# Patient Record
Sex: FEMALE | Employment: FULL TIME | URBAN - METROPOLITAN AREA
[De-identification: names, ages, dates, MRNs, and addresses within clinical notes are randomized per-mention and may not be internally consistent; named-entity substitution may affect disease eponyms.]

---

## 2018-04-26 LAB
HPV ABSTRACT: NORMAL
PAP-ABSTRACT: NORMAL

## 2019-08-04 ENCOUNTER — TRANSFERRED RECORDS (OUTPATIENT)
Dept: MULTI SPECIALTY CLINIC | Facility: CLINIC | Age: 39
End: 2019-08-04

## 2019-08-04 LAB
CHOLEST SERPL-MCNC: 177 MG/DL (ref 0–199)
GLUCOSE SERPL-MCNC: 102 MG/DL (ref 70–100)
HBA1C MFR BLD: 5.4 % (ref 0–5.7)
HDLC SERPL-MCNC: 60 MG/DL
LDLC SERPL CALC-MCNC: 107 MG/DL
NONHDLC SERPL-MCNC: 117 MG/DL
TRIGL SERPL-MCNC: 50 MG/DL

## 2019-09-17 ENCOUNTER — OFFICE VISIT (OUTPATIENT)
Dept: FAMILY MEDICINE | Facility: CLINIC | Age: 39
End: 2019-09-17
Payer: OTHER MISCELLANEOUS

## 2019-09-17 ENCOUNTER — ALLIED HEALTH/NURSE VISIT (OUTPATIENT)
Dept: NURSING | Facility: CLINIC | Age: 39
End: 2019-09-17
Payer: OTHER MISCELLANEOUS

## 2019-09-17 ENCOUNTER — ANCILLARY PROCEDURE (OUTPATIENT)
Dept: GENERAL RADIOLOGY | Facility: CLINIC | Age: 39
End: 2019-09-17
Attending: FAMILY MEDICINE
Payer: OTHER MISCELLANEOUS

## 2019-09-17 VITALS
OXYGEN SATURATION: 100 % | HEART RATE: 67 BPM | DIASTOLIC BLOOD PRESSURE: 76 MMHG | SYSTOLIC BLOOD PRESSURE: 113 MMHG | WEIGHT: 136.8 LBS

## 2019-09-17 VITALS
DIASTOLIC BLOOD PRESSURE: 76 MMHG | HEIGHT: 64 IN | HEART RATE: 67 BPM | WEIGHT: 136.8 LBS | SYSTOLIC BLOOD PRESSURE: 113 MMHG | OXYGEN SATURATION: 100 % | BODY MASS INDEX: 23.35 KG/M2 | TEMPERATURE: 98 F | RESPIRATION RATE: 16 BRPM

## 2019-09-17 DIAGNOSIS — S61.219A FINGER LACERATION, INITIAL ENCOUNTER: ICD-10-CM

## 2019-09-17 DIAGNOSIS — S62.639A CLOSED FRACTURE OF TUFT OF DISTAL PHALANX OF FINGER: ICD-10-CM

## 2019-09-17 DIAGNOSIS — S60.139A: ICD-10-CM

## 2019-09-17 DIAGNOSIS — S67.22XA HAND CRUSH INJURY, LEFT, INITIAL ENCOUNTER: Primary | ICD-10-CM

## 2019-09-17 DIAGNOSIS — S67.22XA HAND CRUSH INJURY, LEFT, INITIAL ENCOUNTER: ICD-10-CM
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
## 2019-09-17 PROCEDURE — 11760 REPAIR OF NAIL BED: CPT | Mod: F3 | Performed by: FAMILY MEDICINE

## 2019-09-17 PROCEDURE — 73130 X-RAY EXAM OF HAND: CPT | Mod: LT

## 2019-09-17 PROCEDURE — 99213 OFFICE O/P EST LOW 20 MIN: CPT | Mod: 25 | Performed by: FAMILY MEDICINE

## 2019-09-17 PROCEDURE — 12001 RPR S/N/AX/GEN/TRNK 2.5CM/<: CPT | Performed by: FAMILY MEDICINE

## 2019-09-17 RX ORDER — HYDROCODONE BITARTRATE AND ACETAMINOPHEN 5; 325 MG/1; MG/1
1 TABLET ORAL EVERY 6 HOURS PRN
Qty: 20 TABLET | Refills: 0 | Status: SHIPPED | OUTPATIENT
Start: 2019-09-17 | End: 2019-09-26

## 2019-09-17 RX ORDER — ALPRAZOLAM 1 MG
1 TABLET ORAL
COMMUNITY
Start: 2019-07-31

## 2019-09-17 ASSESSMENT — PAIN SCALES - GENERAL
PAINLEVEL: WORST PAIN (10)
PAINLEVEL: WORST PAIN (10)

## 2019-09-17 ASSESSMENT — MIFFLIN-ST. JEOR: SCORE: 1290.52

## 2019-09-17 NOTE — NURSING NOTE
This smart phrase will be removed from your smart phrase list on September 16th.   Please use the system smart phrase: .abstractdata which has been updated.    Please abstract the following data from this visit with this patient into the appropriate field in Epic:    Tests that can be patient reported without a hard copy:          Other Tests found in the patient's chart through Chart Review/Care Everywhere:    Pap smear done by [facility] on this date: 04/26/2018    Note to Abstraction: If this section is blank, no results were found via Chart Review/Care Everywhere.

## 2019-09-17 NOTE — PROGRESS NOTES
"Subjective     Leona Cano is a 39 year old female who presents to clinic today for the following health issues:    HPI   Concern - Laceration   Onset: today     Description:   Patient stated that a machine at work was moving to much and was trying to stop it by holding it and crushed her hand.     Intensity: severe- painful make her want to throw up    Progression of Symptoms:  Off and on     Therapies Tried and outcome: Nothing but used a paper towel patient didn't clean wound.    There is no problem list on file for this patient.    History reviewed. No pertinent surgical history.    Social History     Tobacco Use     Smoking status: Former Smoker     Smokeless tobacco: Never Used   Substance Use Topics     Alcohol use: Yes     Family History   Problem Relation Age of Onset     Heart Disease Mother            Reviewed and updated as needed this visit by Provider         Review of Systems   ROS COMP: Constitutional, HEENT, cardiovascular, pulmonary, GI, , musculoskeletal, neuro, skin, endocrine and psych systems are negative, except as otherwise noted.      Objective    /76 (BP Location: Right arm, Patient Position: Sitting, Cuff Size: Adult Regular)   Pulse 67   Temp 98  F (36.7  C) (Oral)   Resp 16   Ht 1.626 m (5' 4\")   Wt 62.1 kg (136 lb 12.8 oz)   LMP 09/03/2019 (Approximate)   SpO2 100%   Breastfeeding? No   BMI 23.48 kg/m    Body mass index is 23.48 kg/m .  Physical Exam   GENERAL: healthy, alert and no distress  NECK: no adenopathy, no asymmetry, masses, or scars and thyroid normal to palpation  RESP: lungs clear to auscultation - no rales, rhonchi or wheezes  CV: regular rate and rhythm, normal S1 S2, no S3 or S4, no murmur, click or rub, no peripheral edema and peripheral pulses strong  ABDOMEN: soft, nontender, no hepatosplenomegaly, no masses and bowel sounds normal  MS: no gross musculoskeletal defects noted, no edema  SKIN: 2 cm laceration of 4th, left digit. 3rd digit nail bed " partially damage and protruding out with nail bed laceration.    Diagnostic Test Results:  Labs reviewed in Epic        Assessment & Plan     1. Hand crush injury, left, initial encounter  Distal 3rd fracture. Comfort care at this time. Should heal 6-8 weeks discussed.  - XR Hand Left G/E 3 Views; Future  - HYDROcodone-acetaminophen (NORCO) 5-325 MG tablet; Take 1 tablet by mouth every 6 hours as needed for pain  Dispense: 20 tablet; Refill: 0    2. Finger laceration, initial encounter  Area cleansed with sterile water and iodine. Local anesthesia done with 2% lido w/o epi. 5 sutures placed in 4th digit and 4 sutures place in 3rd digit. RTC in 10 days for removal.  - REPAIR INTERMED, WOUND NCK/HNDS/FEET/GEN <=2.5 CM    3. Closed fracture of tuft of distal phalanx of finger  Buddy tape for comfort.    4. Contusion of middle finger with damage to nail, initial encounter  Digital block done with 3 ml of 2% lidocaine w/o epi. Nail removed without complications. Nailbed lacerations repaired with sutures.  - REMOVAL NAIL/NAIL BED, PARTIAL OR COMPLETE       See Patient Instructions    Return in about 10 days (around 9/27/2019) for suture removal.    Joey Valente MD, MD  [facility]

## 2019-09-17 NOTE — Clinical Note
This smart phrase will be removed from your smart phrase list on September 16th. Please use the system smart phrase: .abstractdata which has been updated.Please abstract the following data from this visit with this patient into the appropriate field in Epic:Tests that can be patient reported without a hard copy:{Quality Abstract List (Optional):915362}Other Tests found in the patient's chart through Chart Review/Care Everywhere:Pap smear done by [facility] on this date: 04/26/2018Note to Abstraction: If this section is blank, no results were found via Chart Review/Care Everywhere.

## 2019-09-17 NOTE — NURSING NOTE
RN Triage:     Chief Complaint   Patient presents with     Work Comp     crushing finger injury, left hand       Initial /76 (BP Location: Right arm, Patient Position: Chair, Cuff Size: Adult Regular)   Pulse 67   Wt 62.1 kg (136 lb 12.8 oz)   SpO2 100%  There is no height or weight on file to calculate BMI.  BP completed using cuff size: regular    Assessment:  Stable but nauseous from the pain.    Triage Dispo: patient is being roomed by MA staff    Intervention: removed toilet paper wrap and given gauze.    Luh Bai, RN, RN

## 2019-09-17 NOTE — LETTER
[address] [address]  Phone: [phone]    September 17, 2019        Leona Cano  [address]  [facility] [address]          To whom it may concern:    RE: Leona Cano    Patient may return to work 9/23/2019 with the following:  Light duty-unable to use left hand.  When the patient returns to work, the following restrictions apply until 10/15/2019.      Please contact me for questions or concerns.      Sincerely,        Joey Valente MD

## 2019-09-26 ENCOUNTER — OFFICE VISIT (OUTPATIENT)
Dept: FAMILY MEDICINE | Facility: CLINIC | Age: 39
End: 2019-09-26
Payer: OTHER MISCELLANEOUS

## 2019-09-26 VITALS
BODY MASS INDEX: 23.22 KG/M2 | HEIGHT: 64 IN | TEMPERATURE: 98.4 F | RESPIRATION RATE: 16 BRPM | DIASTOLIC BLOOD PRESSURE: 81 MMHG | SYSTOLIC BLOOD PRESSURE: 139 MMHG | HEART RATE: 73 BPM | WEIGHT: 136 LBS | OXYGEN SATURATION: 99 %

## 2019-09-26 DIAGNOSIS — S67.22XA HAND CRUSH INJURY, LEFT, INITIAL ENCOUNTER: Primary | ICD-10-CM

## 2019-09-26 PROCEDURE — 99213 OFFICE O/P EST LOW 20 MIN: CPT | Mod: 24 | Performed by: FAMILY MEDICINE

## 2019-09-26 RX ORDER — HYDROCODONE BITARTRATE AND ACETAMINOPHEN 5; 325 MG/1; MG/1
1 TABLET ORAL EVERY 6 HOURS PRN
Qty: 20 TABLET | Refills: 0 | Status: SHIPPED | OUTPATIENT
Start: 2019-09-26 | End: 2019-11-22

## 2019-09-26 RX ORDER — SULFAMETHOXAZOLE/TRIMETHOPRIM 800-160 MG
1 TABLET ORAL 2 TIMES DAILY
Qty: 20 TABLET | Refills: 0 | Status: SHIPPED | OUTPATIENT
Start: 2019-09-26 | End: 2019-11-22

## 2019-09-26 ASSESSMENT — MIFFLIN-ST. JEOR: SCORE: 1286.89

## 2019-09-26 ASSESSMENT — PAIN SCALES - GENERAL: PAINLEVEL: NO PAIN (0)

## 2019-09-26 NOTE — PROGRESS NOTES
"Subjective     Leona Cano is a 39 year old female who presents to clinic today for the following health issues:    HPI   Musculoskeletal problem/pain      Duration: 09/17/2019    Description  Location: Left hand     Intensity:  Mild     Accompanying signs and symptoms: throbbing and pinching pain     History  Previous similar problem: no   Previous evaluation:  x-ray 09/17/2019    Precipitating or alleviating factors:  Trauma or overuse: YES- Work Related Injury   Aggravating factors include: none    Therapies tried and outcome: tylenol and Norco     -patient states that on one of the stitches, it looks red with some pus, unsure if it is infected or not, seems like the stitch it is was coming out.   -Patient has been icing and elevating the hand to help with the bruising and swelling      There is no problem list on file for this patient.    History reviewed. No pertinent surgical history.    Social History     Tobacco Use     Smoking status: Former Smoker     Smokeless tobacco: Never Used   Substance Use Topics     Alcohol use: Yes     Family History   Problem Relation Age of Onset     Heart Disease Mother            Reviewed and updated as needed this visit by Provider         Review of Systems   ROS COMP: Constitutional, HEENT, cardiovascular, pulmonary, GI, , musculoskeletal, neuro, skin, endocrine and psych systems are negative, except as otherwise noted.      Objective    /81 (BP Location: Left arm, Patient Position: Chair, Cuff Size: Adult Regular)   Pulse 73   Temp 98.4  F (36.9  C) (Oral)   Resp 16   Ht 1.626 m (5' 4\")   Wt 61.7 kg (136 lb)   LMP 09/03/2019 (Approximate)   SpO2 99%   BMI 23.34 kg/m    Body mass index is 23.34 kg/m .  Physical Exam   GENERAL: healthy, alert and no distress  NECK: no adenopathy, no asymmetry, masses, or scars and thyroid normal to palpation  RESP: lungs clear to auscultation - no rales, rhonchi or wheezes  CV: regular rate and rhythm, normal S1 S2, no S3 " or S4, no murmur, click or rub, no peripheral edema and peripheral pulses strong  ABDOMEN: soft, nontender, no hepatosplenomegaly, no masses and bowel sounds normal  MS: no gross musculoskeletal defects noted, no edema  SKIN: lacerations healing some surrounding redness with yellow crusts  Diagnostic Test Results:  Labs reviewed in Epic        Assessment & Plan     1. Hand crush injury, left, initial encounter  Healing laceration. 9 sutures removed. Cover with bactrim for possible infection. RTC in 1 week for recheck. Discussed tuft fractures. Patient comfortable at this time.  - sulfamethoxazole-trimethoprim (BACTRIM DS) 800-160 MG tablet; Take 1 tablet by mouth 2 times daily for 10 days  Dispense: 20 tablet; Refill: 0  - HYDROcodone-acetaminophen (NORCO) 5-325 MG tablet; Take 1 tablet by mouth every 6 hours as needed for pain  Dispense: 20 tablet; Refill: 0       Regular exercise  See Patient Instructions    Return in about 1 week (around 10/3/2019) for finger lacerations.    Joey Valente MD, MD  [facility]

## 2019-09-26 NOTE — PATIENT INSTRUCTIONS
At [facility], we strive to deliver an exceptional experience to you, every time we see you.  If you receive a survey in the mail, please send us back your thoughts. We really do value your feedback.    Based on your medical history, these are the current health maintenance/preventive care services that you are due for (some may have been done at this visit.)  Health Maintenance Due   Topic Date Due     PREVENTIVE CARE VISIT  1982     HIV SCREENING  05/05/1997     INFLUENZA VACCINE (1) 09/01/2019         Suggested websites for health information:  Www.[facility].org : Up to date and easily searchable information on multiple topics.  Www.medlineplus.gov : medication info, interactive tutorials, watch real surgeries online  Www.familydoctor.org : good info from the Academy of Family Physicians  Www.cdc.gov : public health info, travel advisories, epidemics (H1N1)  Www.aap.org : children's health info, normal development, vaccinations  Www.health.[facility].[place].us : [place] dept of health, public health issues in [place], N1N1    Your care team:                            Family Medicine Internal Medicine   MD Desmond Bowie MD Shantel Branch-Fleming, MD Katya Georgiev PA-C Nam Ho, MD Pediatrics   NEETA Kuo, STEVEN GRADY CNP   MD Emma Martinez MD Deborah Mielke, MD Kim Thein, APRMARY ANN CNP      Clinic hours: Monday - Thursday 7 am-7 pm; Fridays 7 am-5 pm.   Urgent care: Monday - Friday 11 am-9 pm; Saturday and Sunday 9 am-5 pm.  Pharmacy : Monday -Thursday 8 am-8 pm; Friday 8 am-6 pm; Saturday and Sunday 9 am-5 pm.     Clinic: [phone]   Pharmacy: [phone]

## 2019-09-26 NOTE — LETTER
[address] [address]  Phone: [phone]    September 26, 2019        Leona Cano  [address]  [facility] [address]          To whom it may concern:    RE: Leona Cano    Patient was seen and treated today at our clinic.  Patient was seen and treated today at our clinic and missed work. Patient will be seen in 1 week for reevaluation. Patient can work once medically cleared.    Please contact me for questions or concerns.      Sincerely,        Joey Valente MD

## 2019-10-04 ENCOUNTER — OFFICE VISIT (OUTPATIENT)
Dept: FAMILY MEDICINE | Facility: CLINIC | Age: 39
End: 2019-10-04
Payer: OTHER MISCELLANEOUS

## 2019-10-04 VITALS
DIASTOLIC BLOOD PRESSURE: 99 MMHG | HEART RATE: 70 BPM | WEIGHT: 134.4 LBS | OXYGEN SATURATION: 99 % | SYSTOLIC BLOOD PRESSURE: 144 MMHG | HEIGHT: 64 IN | BODY MASS INDEX: 22.94 KG/M2 | RESPIRATION RATE: 16 BRPM | TEMPERATURE: 98.2 F

## 2019-10-04 DIAGNOSIS — S67.22XA HAND CRUSH INJURY, LEFT, INITIAL ENCOUNTER: ICD-10-CM

## 2019-10-04 PROCEDURE — 99213 OFFICE O/P EST LOW 20 MIN: CPT | Performed by: FAMILY MEDICINE

## 2019-10-04 ASSESSMENT — MIFFLIN-ST. JEOR: SCORE: 1279.63

## 2019-10-04 ASSESSMENT — PAIN SCALES - GENERAL: PAINLEVEL: MILD PAIN (2)

## 2019-10-04 NOTE — PROGRESS NOTES
"Subjective     Leona Cano is a 39 year old female who presents to clinic today for the following health issues:    HPI     1. Work Comp follow up - 9/17/19 - hand crush injury, left   - throbbing, intermittent sharp pain   - pt states had some nausea with Hydrocodone      There is no problem list on file for this patient.    No past surgical history on file.    Social History     Tobacco Use     Smoking status: Former Smoker     Smokeless tobacco: Never Used   Substance Use Topics     Alcohol use: Yes     Family History   Problem Relation Age of Onset     Heart Disease Mother            Reviewed and updated as needed this visit by Provider         Review of Systems   ROS COMP: Constitutional, HEENT, cardiovascular, pulmonary, GI, , musculoskeletal, neuro, skin, endocrine and psych systems are negative, except as otherwise noted.      Objective    BP (!) 144/99 (BP Location: Left arm, Patient Position: Sitting, Cuff Size: Adult Regular)   Pulse 70   Temp 98.2  F (36.8  C) (Oral)   Resp 16   Ht 1.626 m (5' 4\")   Wt 61 kg (134 lb 6.4 oz)   SpO2 99%   BMI 23.07 kg/m    Body mass index is 23.07 kg/m .  Physical Exam   GENERAL: healthy, alert and no distress  NECK: no adenopathy, no asymmetry, masses, or scars and thyroid normal to palpation  RESP: lungs clear to auscultation - no rales, rhonchi or wheezes  CV: regular rate and rhythm, normal S1 S2, no S3 or S4, no murmur, click or rub, no peripheral edema and peripheral pulses strong  ABDOMEN: soft, nontender, no hepatosplenomegaly, no masses and bowel sounds normal  MS: left, 2nd and 3rd digits are healing, swelling has come down. Patient does have some numbness distal 3rd digit    Diagnostic Test Results:  Labs reviewed in Epic        Assessment & Plan     1. Hand crush injury, left, initial encounter  Healing. May have some nerve damage. Will monitor. Discussed exercises to work on ROM. RTC in 2 weeks for recheck.         See Patient " Instructions    Return in about 2 weeks (around 10/18/2019) for hand recheck.    Joey Valente MD, MD  [facility]

## 2019-10-17 ENCOUNTER — OFFICE VISIT (OUTPATIENT)
Dept: FAMILY MEDICINE | Facility: CLINIC | Age: 39
End: 2019-10-17
Payer: OTHER MISCELLANEOUS

## 2019-10-17 VITALS
HEIGHT: 64 IN | RESPIRATION RATE: 12 BRPM | TEMPERATURE: 98.1 F | OXYGEN SATURATION: 99 % | WEIGHT: 134 LBS | HEART RATE: 81 BPM | DIASTOLIC BLOOD PRESSURE: 74 MMHG | SYSTOLIC BLOOD PRESSURE: 114 MMHG | BODY MASS INDEX: 22.88 KG/M2

## 2019-10-17 DIAGNOSIS — S67.22XD HAND CRUSH INJURY, LEFT, SUBSEQUENT ENCOUNTER: Primary | ICD-10-CM

## 2019-10-17 PROCEDURE — 99214 OFFICE O/P EST MOD 30 MIN: CPT | Performed by: FAMILY MEDICINE

## 2019-10-17 ASSESSMENT — MIFFLIN-ST. JEOR: SCORE: 1277.82

## 2019-10-17 ASSESSMENT — PAIN SCALES - GENERAL: PAINLEVEL: MILD PAIN (3)

## 2019-10-17 NOTE — LETTER
[address] [address]  Phone: [phone]    October 17, 2019        Leona Cano  [address]  [facility] [address]          To whom it may concern:    RE: Leona Cano    Patient was seen and treated today at our clinic.  Patient was seen and treated today at our clinic and missed work. Patient will be seen in 2 week for reevaluation. Patient has been referred to hand therapy to improve range of motion and strength. Patient can work once medically cleared.     Please contact me for questions or concerns.      Sincerely,        Joey Valente MD

## 2019-10-17 NOTE — PATIENT INSTRUCTIONS
At [facility], we strive to deliver an exceptional experience to you, every time we see you.  If you receive a survey in the mail, please send us back your thoughts. We really do value your feedback.    Based on your medical history, these are the current health maintenance/preventive care services that you are due for (some may have been done at this visit.)  Health Maintenance Due   Topic Date Due     PREVENTIVE CARE VISIT  1982     HIV SCREENING  05/05/1997     INFLUENZA VACCINE (1) 09/01/2019         Suggested websites for health information:  Www.[facility].org : Up to date and easily searchable information on multiple topics.  Www.medlineplus.gov : medication info, interactive tutorials, watch real surgeries online  Www.familydoctor.org : good info from the Academy of Family Physicians  Www.cdc.gov : public health info, travel advisories, epidemics (H1N1)  Www.aap.org : children's health info, normal development, vaccinations  Www.health.[facility].[place].us : [place] dept of health, public health issues in [place], N1N1    Your care team:                            Family Medicine Internal Medicine   MD Desmond Bowie MD Shantel Branch-Fleming, MD Katya Georgiev PA-C Nam Ho, MD Pediatrics   NEETA Kuo, STEVEN GRADY CNP   MD Emma Martinez MD Deborah Mielke, MD Kim Thein, APRMARY ANN CNP      Clinic hours: Monday - Thursday 7 am-7 pm; Fridays 7 am-5 pm.   Urgent care: Monday - Friday 11 am-9 pm; Saturday and Sunday 9 am-5 pm.  Pharmacy : Monday -Thursday 8 am-8 pm; Friday 8 am-6 pm; Saturday and Sunday 9 am-5 pm.     Clinic: [phone]   Pharmacy: [phone]

## 2019-10-17 NOTE — PROGRESS NOTES
"Subjective     Leona Cano is a 39 year old female who presents to clinic today for the following health issues:    HPI     Work Comp Follow up - 9/17/19. Crushed hand at work.  Patient states still feeling like pins, sharp pain on a spot on finger when bending.      There is no problem list on file for this patient.    History reviewed. No pertinent surgical history.    Social History     Tobacco Use     Smoking status: Former Smoker     Smokeless tobacco: Never Used   Substance Use Topics     Alcohol use: Yes     Family History   Problem Relation Age of Onset     Heart Disease Mother            Reviewed and updated as needed this visit by Provider         Review of Systems   ROS COMP: Constitutional, HEENT, cardiovascular, pulmonary, GI, , musculoskeletal, neuro, skin, endocrine and psych systems are negative, except as otherwise noted.      Objective    /74 (BP Location: Left arm, Patient Position: Sitting, Cuff Size: Adult Regular)   Pulse 81   Temp 98.1  F (36.7  C) (Oral)   Resp 12   Ht 1.626 m (5' 4\")   Wt 60.8 kg (134 lb)   LMP  (LMP Unknown)   SpO2 99%   BMI 23.00 kg/m    Body mass index is 23 kg/m .  Physical Exam   GENERAL: healthy, alert and no distress  NECK: no adenopathy, no asymmetry, masses, or scars and thyroid normal to palpation  RESP: lungs clear to auscultation - no rales, rhonchi or wheezes  CV: regular rate and rhythm, normal S1 S2, no S3 or S4, no murmur, click or rub, no peripheral edema and peripheral pulses strong  ABDOMEN: soft, nontender, no hepatosplenomegaly, no masses and bowel sounds normal  MS: patient able to flex fingers but not fully compared to right. The lacerations has healed but the left 3rd digit healing is more raised, possible scar formation, does have numbness distal left 3rd digit and pain tips of 3rd and 2nd digit.  Diagnostic Test Results:  Labs reviewed in Epic        Assessment & Plan     1. Hand crush injury, left, subsequent encounter  Likely " has pain from nerve damage from crush injury and scar formation of 3rd digit. Will refer patient for hand therapy. Reevaluate in 2 weeks. Unable to work at this time.  - [facility] PT, HAND, AND CHIROPRACTIC REFERRAL; Future       Regular exercise  See Patient Instructions    Return in about 2 weeks (around 10/31/2019).    Joey Valente MD, MD  [facility]

## 2019-10-21 NOTE — PROGRESS NOTES
"Hand Therapy Initial Evaluation  Current Date:  10/22/2019    Diagnosis: L hand crush injury (Per chart review on 9/17/19 - 3. Closed fracture of tuft of distal phalanx of finger)  DOI: 9/17/2019 (10/17/2019 MD order date)    Referring physician: Joey Valente MD    Per MD note on 10/17/19: \"Likely has pain from nerve damage from crush injury and scar formation of 3rd digit. Will refer patient for hand therapy. Reevaluate in 2 weeks. Unable to work at this time.\"    Precautions: Unable to work, no strengthening    Subjective:  Leona Cano is a 39 year old female.    Patient reports symptoms of the left hand, LF, RF which occurred due to a crush injury during machine assembly at work. Since onset symptoms are Gradually getting better.  Special tests:  x-ray.  Previous treatment: yan tape, prescription medication.    General health as reported by patient is fair.  Pertinent medical history includes: anxiety  Medical allergies:none.  Surgical history: none.  Medication history: Xanax.    Current occupation is [employer], mold setup for plastic injection molding. Pt has to complete a lot of fine motor tasks.  Currently not working due to present treatment problem  Job Tasks: Lifting, Carrying, Operating a Machine, Assembly, Prolonged Standing, Pushing, Pulling, Repetitive Tasks    Occupational Profile Information:  Right hand dominant  Prior functional level:  independent-shared household chores  Patient reports symptoms of pain, stiffness/loss of motion, weakness/loss of strength, edema, numbness and tingling   Barriers include:none  Mobility: No difficulty  Transportation: drives, usually wife drives d/t current problem  Leisure activities/hobbies: Biking, hiking, walking, camping, rock climbing, outdoor stuff  Other: Pt is , no pets or children    Functional Outcome Measure:   Upper Extremity Functional Index Score:  SCORE:   Column Totals: /80: 38   (A lower score indicates greater " disability.)    Objective:  Pain Level (Scale 0-10)   10/22/2019   At Rest 0   With Use 1     Pain Description  Date 10/22/2019   Location hand, long finger and ring finger   Pain Quality Numb, Tingling and pulling   Frequency intermittent     Pain is worst  daytime   Exacerbated by  Doing dishes, opening a jar, washing hair, carrying items   Relieved by rest and prescription meds, no longer using   Progression Gradually improving     Scar: Moderate adherence. Raised, red, closed, sensitive to deep touch. Pt denied sensitivity to light tough. Stated it is most bothersome when doing dishes.    Edema (Circumference measured in cm)   10/22/2019 10/22/2019   LF L R   P1 5.0 4.8   PIP 5.2 4.9   P2 4.5 4.3   Edema (Circumference measured in cm)   10/22/2019 10/22/2019   RF L R   P1 4.5 4.4   PIP 4.6 4.6   P2 3.9 3.9     Sensation   Decreased Median Nerve distribution per pt report in volar aspect of LF P3    ROM  Hand 10/22/2019 10/22/2019   AROM(PROM) L R   Index MP 0/85 0/86   PIP 0/82 0/88   DIP 0/69 0/74   VILLAFUERTE 236 248   Long MP 0/89 0/89   PIP -10/85 0/87   DIP -14/46 0/76   VILLAF[place]ERTE 196 252   Ring MP 0/90 0/93   PIP -3/86 0/89   DIP 0/45 0/74   VILLAFUERTE 218 256   Small MP 0/92 0/92   PIP 0/86 0/88   DIP 0/70 0/80   VILLAFUERTE 248 260     Strength: Contraindicated    Assessment:  Patient presents with symptoms consistent with diagnosis of left long finger and ring finger  Crush injury,  with conservative intervention.     Patient's limitations or Problem List includes:  Pain, Decreased ROM/motion, Weakness, Sensory disturbance and Adherent scarring of the left long finger and ring finger which interferes with the patient's ability to perform Self Care Tasks (dressing, eating, bathing, hygiene/toileting), Work Tasks, Sleep Patterns, Recreational Activities, Household Chores and Driving  as compared to previous level of function.    Rehab Potential:  Good - Return to full activity, some limitations    Patient will benefit from skilled  Occupational Therapy to increase ROM, overall strength, coordination, dexterity and sensation and decrease pain, edema and adherence of scarring to return to previous activity level and resume normal daily tasks and to reach their rehab potential.    Barriers to Learning:  Mental health    Communication Issues:  Patient appears to be able to clearly communicate and understand verbal and written communication and follow directions correctly.    Chart Review: Chart Review, Brief history including review of medical and/or therapy records relating to the presenting problem and Simple history review with patient    Identified Performance Deficits: bathing/showering, toileting, dressing, feeding, hygiene and grooming, driving and community mobility, home establishment and management, meal preparation and cleanup, shopping, sleep, work and leisure activities    Assessment of Occupational Performance:  5 or more Performance Deficits    Clinical Decision Making (Complexity): Low complexity    Treatment Explanation:  The following has been discussed with the patient:  RX ordered/plan of care  Anticipated outcomes  Possible risks and side effects    Plan:  Frequency:  2 X week, once daily  Duration:  for 1 week tapering to 1 X a week over 4 weeks    Treatment Plan:   Modalities:  US, Fluidotherapy and Paraffin  Therapeutic Exercise:  AROM, AAROM, PROM, Tendon Gliding, Blocking, Reverse Blocking, Place and Hold, Contract Relax, Extensor Tracking, Isotonics, Isometrics and Stabilization  Neuromuscular re-education:  Nerve Gliding, Coordination/Dexterity, Sensory re-education, Desensitization, Kinesthetic Training, Kinesiotaping and Isometrics  Manual Techniques:  Coordination/Dexterity, Scar mobilization, Myofascial release and Manual edema mobilization  Orthotic Fabrication:  Static orthosis   Self Care:  Self Care Tasks, Ergonomic Considerations and Work Tasks    Discharge Plan:  Achieve all LTG.  Independent in home treatment  program.  Reach maximal therapeutic benefit.    Home Exercise Program:  Heat prior to exercises  Scar massage  Table top tendon gliding fist series  Finger AROM reverse blocking, hand in supination, extend against table  Finger AROM IP joint blocking  Proximal median nerve glide    Next Visit:  Consider US for scar mobilization  Scar massage  AROM/PROM flexion  Review HEP, consider progressing to AAROM

## 2019-10-22 ENCOUNTER — THERAPY VISIT (OUTPATIENT)
Dept: OCCUPATIONAL THERAPY | Facility: CLINIC | Age: 39
End: 2019-10-22
Attending: FAMILY MEDICINE
Payer: OTHER MISCELLANEOUS

## 2019-10-22 DIAGNOSIS — S67.22XD HAND CRUSH INJURY, LEFT, SUBSEQUENT ENCOUNTER: Primary | ICD-10-CM

## 2019-10-22 DIAGNOSIS — R20.2 PARESTHESIA: ICD-10-CM

## 2019-10-22 PROCEDURE — 97110 THERAPEUTIC EXERCISES: CPT | Mod: GO | Performed by: OCCUPATIONAL THERAPIST

## 2019-10-22 PROCEDURE — 97140 MANUAL THERAPY 1/> REGIONS: CPT | Mod: GO | Performed by: OCCUPATIONAL THERAPIST

## 2019-10-22 PROCEDURE — 97165 OT EVAL LOW COMPLEX 30 MIN: CPT | Mod: GO | Performed by: OCCUPATIONAL THERAPIST

## 2019-10-24 ENCOUNTER — THERAPY VISIT (OUTPATIENT)
Dept: OCCUPATIONAL THERAPY | Facility: CLINIC | Age: 39
End: 2019-10-24
Payer: OTHER MISCELLANEOUS

## 2019-10-24 DIAGNOSIS — R20.2 PARESTHESIA: ICD-10-CM

## 2019-10-24 PROCEDURE — 97018 PARAFFIN BATH THERAPY: CPT | Mod: GO | Performed by: OCCUPATIONAL THERAPIST

## 2019-10-24 PROCEDURE — 97110 THERAPEUTIC EXERCISES: CPT | Mod: GO | Performed by: OCCUPATIONAL THERAPIST

## 2019-10-24 PROCEDURE — 97140 MANUAL THERAPY 1/> REGIONS: CPT | Mod: GO | Performed by: OCCUPATIONAL THERAPIST

## 2019-10-28 ENCOUNTER — THERAPY VISIT (OUTPATIENT)
Dept: OCCUPATIONAL THERAPY | Facility: CLINIC | Age: 39
End: 2019-10-28
Payer: OTHER MISCELLANEOUS

## 2019-10-28 DIAGNOSIS — R20.2 PARESTHESIA: ICD-10-CM

## 2019-10-28 PROCEDURE — 97018 PARAFFIN BATH THERAPY: CPT | Mod: GO | Performed by: OCCUPATIONAL THERAPIST

## 2019-10-28 PROCEDURE — 97140 MANUAL THERAPY 1/> REGIONS: CPT | Mod: GO | Performed by: OCCUPATIONAL THERAPIST

## 2019-10-28 PROCEDURE — 97110 THERAPEUTIC EXERCISES: CPT | Mod: GO | Performed by: OCCUPATIONAL THERAPIST

## 2019-11-05 ENCOUNTER — OFFICE VISIT (OUTPATIENT)
Dept: FAMILY MEDICINE | Facility: CLINIC | Age: 39
End: 2019-11-05
Payer: OTHER MISCELLANEOUS

## 2019-11-05 ENCOUNTER — ANCILLARY PROCEDURE (OUTPATIENT)
Dept: GENERAL RADIOLOGY | Facility: CLINIC | Age: 39
End: 2019-11-05
Attending: FAMILY MEDICINE
Payer: OTHER MISCELLANEOUS

## 2019-11-05 VITALS
BODY MASS INDEX: 22.71 KG/M2 | HEIGHT: 64 IN | SYSTOLIC BLOOD PRESSURE: 136 MMHG | TEMPERATURE: 98.1 F | HEART RATE: 79 BPM | RESPIRATION RATE: 12 BRPM | WEIGHT: 133 LBS | DIASTOLIC BLOOD PRESSURE: 82 MMHG | OXYGEN SATURATION: 97 %

## 2019-11-05 DIAGNOSIS — S67.22XA HAND CRUSH INJURY, LEFT, INITIAL ENCOUNTER: ICD-10-CM

## 2019-11-05 PROCEDURE — 99213 OFFICE O/P EST LOW 20 MIN: CPT | Performed by: FAMILY MEDICINE

## 2019-11-05 PROCEDURE — 73140 X-RAY EXAM OF FINGER(S): CPT | Mod: LT

## 2019-11-05 ASSESSMENT — MIFFLIN-ST. JEOR: SCORE: 1273.28

## 2019-11-05 ASSESSMENT — PAIN SCALES - GENERAL: PAINLEVEL: NO PAIN (0)

## 2019-11-05 NOTE — PATIENT INSTRUCTIONS
At [facility], we strive to deliver an exceptional experience to you, every time we see you.  If you receive a survey in the mail, please send us back your thoughts. We really do value your feedback.    Based on your medical history, these are the current health maintenance/preventive care services that you are due for (some may have been done at this visit.)  Health Maintenance Due   Topic Date Due     PREVENTIVE CARE VISIT  1982     HIV SCREENING  05/05/1997     INFLUENZA VACCINE (1) 09/01/2019         Suggested websites for health information:  Www.[facility]org : Up to date and easily searchable information on multiple topics.  Www.medlineplus.gov : medication info, interactive tutorials, watch real surgeries online  Www.familydoctor.org : good info from the Academy of Family Physicians  Www.cdc.gov : public health info, travel advisories, epidemics (H1N1)  Www.aap.org : children's health info, normal development, vaccinations  Www.health.[facility].[place].us : [place] dept of health, public health issues in [place], N1N1    Your care team:                            Family Medicine Internal Medicine   MD Desmond Bowie MD Shantel Branch-Fleming, MD Katya Georgiev PA-C Nam Ho, MD Pediatrics   NEETA Kuo, STEVEN GRADY CNP   MD Emma Martinez MD Deborah Mielke, MD Kim Thein, APRMARY ANN CNP      Clinic hours: Monday - Thursday 7 am-7 pm; Fridays 7 am-5 pm.   Urgent care: Monday - Friday 11 am-9 pm; Saturday and Sunday 9 am-5 pm.  Pharmacy : Monday -Thursday 8 am-8 pm; Friday 8 am-6 pm; Saturday and Sunday 9 am-5 pm.     Clinic: [phone]   Pharmacy: [phone]

## 2019-11-05 NOTE — PROGRESS NOTES
"Subjective     Leona Cano is a 39 year old female who presents to clinic today for the following health issues:    HPI   Work Comp follow up- left middle and ring finger      Duration: 9/17/19    Description (location/character/radiation): follow up crushed fingers at work.    Intensity:  mild    Accompanying signs and symptoms: None. Patient states symptoms improving.    History (similar episodes/previous evaluation): None    Precipitating or alleviating factors: None    Therapies tried and outcome: PT - helping       Patient Active Problem List   Diagnosis     Paresthesia     No past surgical history on file.    Social History     Tobacco Use     Smoking status: Former Smoker     Smokeless tobacco: Never Used   Substance Use Topics     Alcohol use: Yes     Family History   Problem Relation Age of Onset     Heart Disease Mother            Reviewed and updated as needed this visit by Provider         Review of Systems   ROS COMP: Constitutional, HEENT, cardiovascular, pulmonary, GI, , musculoskeletal, neuro, skin, endocrine and psych systems are negative, except as otherwise noted.      Objective    /82 (BP Location: Left arm, Patient Position: Sitting, Cuff Size: Adult Regular)   Pulse 79   Temp 98.1  F (36.7  C) (Oral)   Resp 12   Ht 1.626 m (5' 4\")   Wt 60.3 kg (133 lb)   LMP  (LMP Unknown)   SpO2 97%   BMI 22.83 kg/m    Body mass index is 22.83 kg/m .  Physical Exam   GENERAL: healthy, alert and no distress  NECK: no adenopathy, no asymmetry, masses, or scars and thyroid normal to palpation  RESP: lungs clear to auscultation - no rales, rhonchi or wheezes  CV: regular rate and rhythm, normal S1 S2, no S3 or S4, no murmur, click or rub, no peripheral edema and peripheral pulses strong  ABDOMEN: soft, nontender, no hepatosplenomegaly, no masses and bowel sounds normal  MS: left 3rd and 4th digits healing well. Scar formation decreasing.     Diagnostic Test Results:  Labs reviewed in Epic    "     Assessment & Plan     1. Hand crush injury, left, initial encounter  Improving with physical therapy, continue. Xray ordered for tuft fractures follow up. Patient will not work at this time until rechecked in about 2 weeks. Patient close to going back to work.  - XR Finger Left G/E 2 Views; Future       See Patient Instructions    Return in about 2 weeks (around 11/19/2019).    Joey Valente MD, MD  [facility]

## 2019-11-05 NOTE — LETTER
[address] [address]  Phone: [phone]    November 5, 2019        Leona Cano  [address] [facility] [address]          To whom it may concern:    RE: Leona Cano    Patient was seen and treated today at our clinic.  Patient was seen and treated today at our clinic and missed work. Patient will be seen in 2 weeks for reevaluation. Patient is currently going to hand therapy to improve range of motion and strength. Patient can work once medically cleared.      Please contact me for questions or concerns.        Sincerely,           Joey Valente MD

## 2019-11-07 ENCOUNTER — THERAPY VISIT (OUTPATIENT)
Dept: OCCUPATIONAL THERAPY | Facility: CLINIC | Age: 39
End: 2019-11-07
Payer: OTHER MISCELLANEOUS

## 2019-11-07 DIAGNOSIS — R20.2 PARESTHESIA: ICD-10-CM

## 2019-11-07 PROCEDURE — 97110 THERAPEUTIC EXERCISES: CPT | Mod: GO | Performed by: OCCUPATIONAL THERAPIST

## 2019-11-07 PROCEDURE — 97140 MANUAL THERAPY 1/> REGIONS: CPT | Mod: GO | Performed by: OCCUPATIONAL THERAPIST

## 2019-11-07 PROCEDURE — 97530 THERAPEUTIC ACTIVITIES: CPT | Mod: GO | Performed by: OCCUPATIONAL THERAPIST

## 2019-11-07 NOTE — PROGRESS NOTES
SOAP Note - Hand Therapy - Objective Information    Current Date:  11/7/2019  Diagnosis: L hand crush injury (Per chart review on 9/17/19 - 3. Closed fracture of tuft of distal phalanx of finger)  DOI: 9/17/2019 (10/17/2019 MD order date)    Referring physician: Joey Valente MD      Leona Cano is a 39 year old female.    Patient reports symptoms of the left hand, LF, RF which occurred due to a crush injury during machine assembly at work.     Current occupation is [employer], mold setup for plastic injection molding    Occupational Profile Information:  Right hand dominant    S:  Subjective changes as noted by patient: The fingers are better.  Had some pain in the RF with gripping  Functional changes noted by patient: less difficulty with functional tasks      O:  Pain Level (Scale 0-10)   10/22/2019 11/7/19   At Rest 0 0/10   With Use 1 1/10     Pain Description  Date 10/22/2019   Location hand, long finger and ring finger   Pain Quality Numb, Tingling and pulling   Frequency intermittent     Pain is worst  daytime   Exacerbated by  Doing dishes, opening a jar, washing hair, carrying items   Relieved by rest and prescription meds, no longer using   Progression Gradually improving     Scar:Mild adherence. flattened, red, closed, sensitive to deep touch.     Edema (Circumference measured in cm)   10/22/2019 10/22/2019   LF L R   P1 5.0 4.8   PIP 5.2 4.9   P2 4.5 4.3   Edema (Circumference measured in cm)   10/22/2019 10/22/2019   RF L R   P1 4.5 4.4   PIP 4.6 4.6   P2 3.9 3.9     Sensation   Decreased Median Nerve distribution per pt report in volar aspect of LF P3    ROM  Hand 10/22/2019 10/22/2019 11/7/19   AROM(PROM) L R L   Index MP 0/85 0/86    PIP 0/82 0/88    DIP 0/69 0/74    VILLAFUERTE 236 248    Long MP 0/89 0/89 /90   PIP -10/85 0/87 0/100   DIP -14/46 0/76 -7/75   VILLAFUERTE 196 252    Ring MP 0/90 0/93 /90   PIP -3/86 0/89 /103   DIP 0/45 0/74 /70   VILLAFUERTE 218 256    Small MP 0/92 0/92    PIP 0/86 0/88    DIP 0/70  0/80    VILLAFUERTE 248 260      Strength   Painfree (Measured in pounds)  Pain Report:  + mild    ++ moderate    +++ severe    11/7/2019 11/7/2019   Trials Right Left   1  2  3 60  57  55 20  15  15   Average 57 17     Lat Pinch 11/7/2019 11/7/2019   Trials right Left   1  2  3 17 12   Average       3 Pt Pinch 11/7/2019 11/7/2019   Trials Right Left   1  2  3 21 8   Average         Please refer to the daily flowsheet for treatment provided today.   Home Exercise Program:  Heat prior to exercises  Scar massage  Table top tendon gliding fist series  Finger AROM reverse blocking, hand in supination, extend against table  Finger AROM IP joint blocking  Proximal median nerve glide   and pinch strengthening    Next Visit:    Scar massage  AROM/PROM flexion  Strengthening

## 2019-11-14 ENCOUNTER — THERAPY VISIT (OUTPATIENT)
Dept: OCCUPATIONAL THERAPY | Facility: CLINIC | Age: 39
End: 2019-11-14
Payer: OTHER MISCELLANEOUS

## 2019-11-14 DIAGNOSIS — R20.2 PARESTHESIA: ICD-10-CM

## 2019-11-14 PROCEDURE — 97140 MANUAL THERAPY 1/> REGIONS: CPT | Mod: GO | Performed by: OCCUPATIONAL THERAPIST

## 2019-11-14 PROCEDURE — 97110 THERAPEUTIC EXERCISES: CPT | Mod: GO | Performed by: OCCUPATIONAL THERAPIST

## 2019-11-14 PROCEDURE — 97530 THERAPEUTIC ACTIVITIES: CPT | Mod: GO | Performed by: OCCUPATIONAL THERAPIST

## 2019-11-21 ENCOUNTER — THERAPY VISIT (OUTPATIENT)
Dept: OCCUPATIONAL THERAPY | Facility: CLINIC | Age: 39
End: 2019-11-21
Payer: OTHER MISCELLANEOUS

## 2019-11-21 DIAGNOSIS — R20.2 PARESTHESIA: ICD-10-CM

## 2019-11-21 PROCEDURE — 97530 THERAPEUTIC ACTIVITIES: CPT | Mod: GO | Performed by: OCCUPATIONAL THERAPIST

## 2019-11-21 PROCEDURE — 97110 THERAPEUTIC EXERCISES: CPT | Mod: GO | Performed by: OCCUPATIONAL THERAPIST

## 2019-11-21 NOTE — PROGRESS NOTES
Progress Note - Hand Therapy    Current Date:  11/7/2019  Reporting period is 10/22/19 to 11/21/2019    Diagnosis: L hand crush injury (Per chart review on 9/17/19 - 3. Closed fracture of tuft of distal phalanx of finger)  DOI: 9/17/2019 (10/17/2019 MD order date)    Referring physician: Joey Valente MD    Leona Cano is a 39 year old female.    Patient reports symptoms of the left hand, LF, RF which occurred due to a crush injury during machine assembly at work.     Current occupation is [employer], mold setup for plastic injection molding    Occupational Profile Information:  Right hand dominant    S:  Subjective changes as noted by patient: The ring finger is still not a 100%.  It gets sore with use and feels like it will break if I put pressure on it.  Functional changes noted by patient: Improvement with functional tasks    Upper Extremity Functional Index Score:  SCORE:   Column Totals: /80: 65   (A lower score indicates greater disability.)    O:  Pain Level (Scale 0-10)   10/22/2019 11/7/19 11/21/19   At Rest 0 0/10 0/10   With Use 1 1/10 1/10     Pain Description  Date 10/22/2019 11/21/19   Location hand, long finger and ring finger RF   Pain Quality Numb, Tingling and pulling soreness   Frequency intermittent   intermittent   Pain is worst  daytime day   Exacerbated by  Doing dishes, opening a jar, washing hair, carrying items Gripping, lifting   Relieved by rest and prescription meds, no longer using rest   Progression Gradually improving improved     Scar:Mild adherence. flattened,     Edema (Circumference measured in cm)   10/22/2019 10/22/2019 11/21/19   LF L R L   P1 5.0 4.8 4.9   PIP 5.2 4.9 5.0   P2 4.5 4.3 4.2   Edema (Circumference measured in cm)   10/22/2019 10/22/2019 11/21/19   RF L R L   P1 4.5 4.4 4.5   PIP 4.6 4.6 4.5   P2 3.9 3.9 3.9     Sensation   Decreased Median Nerve distribution per pt report in volar aspect of LF P3    ROM  Hand 10/22/2019 10/22/2019 11/7/19 11/21/19    AROM(PROM) L R L L   Index MP 0/85 0/86     PIP 0/82 0/88     DIP 0/69 0/74     VILLAFUERTE 236 248     Long MP 0/89 0/89 /90 /85   PIP -10/85 0/87 0/100 /105   DIP -14/46 0/76 -7/75 -3/75   VILLAFUERTE 196 252     Ring MP 0/90 0/93 /90 /90   PIP -3/86 0/89 /103 /103   DIP 0/45 0/74 /70 /70   VILLAFUERTE 218 256     Small MP 0/92 0/92     PIP 0/86 0/88     DIP 0/70 0/80     VILLAFUERTE 248 260       Strength   Painfree (Measured in pounds)  Pain Report:  + mild    ++ moderate    +++ severe    11/7/2019 11/7/2019 11/21/19   Trials Right Left Left   1  2  3 60  57  55 20  15  15 30  23  22   Average 57 17 25     Lat Pinch 11/7/2019 11/7/2019 11/21/19   Trials right Left Left   1  2  3 17 12 16   Average        3 Pt Pinch 11/7/2019 11/7/2019 11/21/19   Trials Right Left Left   1  2  3 21 8 13   Average          Please refer to the daily flowsheet for treatment provided today.     Assessment:  Response to therapy has been improvement to:  ROM of Fingers: All Planes  Flexibility:  tendon gliding is improved  Strength:   and pinch  Edema:  resolved  Pain:  frequency is less, intensity of pain is decreased, duration of pain is decreased and less tender over affected area  Sensitivity:  scar    Overall Assessment:  Patient's symptoms are resolving.  Patient is ready to progress to more complex exercises.  Patient is becoming more independent in home exercise program  STG/LTG:  STGoals have been reviewed and progress or achievement has occurred;  see goal sheet for details and updates.    I have re-evaluated this patient and find that the nature, scope, duration and intensity of the therapy is appropriate for the medical condition of the patient.  Plan:  Frequency/Duration:  Recommend holding therapy until after MD follow up. May require additional therapy for strengthening in order to return to work.    Home Exercise Program:  Heat prior to exercises  Scar massage  Table top tendon gliding fist series  Finger AROM reverse blocking, hand in  supination, extend against table  Finger AROM IP joint blocking  Proximal median nerve glide   and pinch strengthening    Next Visit:  No further visits scheduled at this time. The chart will be left open for one month to allow patient to schedule further appointments if problems develop. If no additional therapy sessions are scheduled, then the patient will be discharged and this will serve as a discharge note.

## 2019-11-22 ENCOUNTER — OFFICE VISIT (OUTPATIENT)
Dept: FAMILY MEDICINE | Facility: CLINIC | Age: 39
End: 2019-11-22
Payer: OTHER MISCELLANEOUS

## 2019-11-22 VITALS
BODY MASS INDEX: 22.88 KG/M2 | HEART RATE: 68 BPM | WEIGHT: 134 LBS | OXYGEN SATURATION: 100 % | DIASTOLIC BLOOD PRESSURE: 82 MMHG | HEIGHT: 64 IN | SYSTOLIC BLOOD PRESSURE: 134 MMHG | TEMPERATURE: 98.4 F

## 2019-11-22 DIAGNOSIS — R20.2 PARESTHESIA: ICD-10-CM

## 2019-11-22 DIAGNOSIS — S67.22XA HAND CRUSH INJURY, LEFT, INITIAL ENCOUNTER: Primary | ICD-10-CM

## 2019-11-22 PROCEDURE — 99213 OFFICE O/P EST LOW 20 MIN: CPT | Performed by: FAMILY MEDICINE

## 2019-11-22 ASSESSMENT — PAIN SCALES - GENERAL: PAINLEVEL: NO PAIN (0)

## 2019-11-22 ASSESSMENT — MIFFLIN-ST. JEOR: SCORE: 1277.82

## 2019-11-22 NOTE — PROGRESS NOTES
"Subjective     Leona Cano is a 39 year old female who presents to clinic today for the following health issues:    HPI   Joint Pain    Onset: 9/17/19    Description:   Location: Left Hand  Character: Dull ache when lifting    Intensity: moderate    Progression of Symptoms: better    Accompanying Signs & Symptoms:  Other symptoms: swelling    History:   Previous similar pain: YES      Precipitating factors:   Trauma or overuse: YES- W/C    Alleviating factors:  Improved by: physical therapy    Therapies Tried and outcome: PT      Patient Active Problem List   Diagnosis     Paresthesia     History reviewed. No pertinent surgical history.    Social History     Tobacco Use     Smoking status: Former Smoker     Smokeless tobacco: Never Used   Substance Use Topics     Alcohol use: Yes     Family History   Problem Relation Age of Onset     Heart Disease Mother            Reviewed and updated as needed this visit by Provider         Review of Systems   ROS COMP: Constitutional, HEENT, cardiovascular, pulmonary, GI, , musculoskeletal, neuro, skin, endocrine and psych systems are negative, except as otherwise noted.      Objective    /82 (BP Location: Right arm, Patient Position: Chair, Cuff Size: Adult Regular)   Pulse 68   Temp 98.4  F (36.9  C) (Oral)   Ht 1.626 m (5' 4\")   Wt 60.8 kg (134 lb)   SpO2 100%   BMI 23.00 kg/m    Body mass index is 23 kg/m .  Physical Exam   GENERAL: healthy, alert and no distress  NECK: no adenopathy, no asymmetry, masses, or scars and thyroid normal to palpation  RESP: lungs clear to auscultation - no rales, rhonchi or wheezes  CV: regular rate and rhythm, normal S1 S2, no S3 or S4, no murmur, click or rub, no peripheral edema and peripheral pulses strong  ABDOMEN: soft, nontender, no hepatosplenomegaly, no masses and bowel sounds normal  MS: fingers ROM intact. 3rd digit keloid, mildly tender    Diagnostic Test Results:  Labs reviewed in Epic        Assessment & Plan     1. " Hand crush injury, left, initial encounter  Patient stated she wants to try to go back to work with light duty. Patient a little anxious but will try. Okay to do light duty for 1 month. Patient will RTC in 1 month for reevaluation for work status. Continue with exercises taught with physical therapy.    2. Paresthesia  Continue to be there with the fingers. Monitor. Hopefully will improve with time.         Regular exercise  See Patient Instructions    Return in about 4 weeks (around 12/20/2019) for work status.    Joey Valente MD, MD  [facility]

## 2019-11-22 NOTE — PATIENT INSTRUCTIONS
At [facility], we strive to deliver an exceptional experience to you, every time we see you. If you receive a survey, please complete it as we do value your feedback.  If you have MyChart, you can expect to receive results automatically within 24 hours of their completion.  Your provider will send a note interpreting your results as well.   If you do not have MyChart, you should receive your results in about a week by mail.    Your care team:                            Family Medicine Internal Medicine   MD Desmond Bowie MD Shantel Branch-Fleming, MD Katya Georgiev PA-C Megan Hill, APRMARY ANN Valente, MD Pediatrics   Naveen Munoz, PABrisaC  Diana Shea, MD Natalee Gutierrez APRN CNP   MD Emma Martinez MD Deborah Mielke, MD Kim Thein, APRN CNP      Clinic hours: Monday - Thursday 7 am-7 pm; Fridays 7 am-5 pm.   Urgent care: Monday - Friday 11 am-9 pm; Saturday and Sunday 9 am-5 pm.  Pharmacy : Monday -Thursday 8 am-8 pm; Friday 8 am-6 pm; Saturday and Sunday 9 am-5 pm.     Clinic: [phone]   Pharmacy: [phone]

## 2019-11-22 NOTE — LETTER
[address] [address]  Phone: [phone]    November 22, 2019        Leona Cano  [address] [facility] [address]          To whom it may concern:    RE: Leona Cano    Patient was seen and treated today at our clinic and missed work.  Patient may return to work 11/25/2019 with the following:  Light duty-unable to lift more than 5 pounds. Patient will return to clinic approximately in 1 month for reevaluation for work status.    Please contact me for questions or concerns.      Sincerely,        Joey Valente MD

## 2020-01-10 ENCOUNTER — ANCILLARY PROCEDURE (OUTPATIENT)
Dept: GENERAL RADIOLOGY | Facility: CLINIC | Age: 40
End: 2020-01-10
Attending: FAMILY MEDICINE
Payer: OTHER MISCELLANEOUS

## 2020-01-10 ENCOUNTER — OFFICE VISIT (OUTPATIENT)
Dept: FAMILY MEDICINE | Facility: CLINIC | Age: 40
End: 2020-01-10
Payer: OTHER MISCELLANEOUS

## 2020-01-10 VITALS
SYSTOLIC BLOOD PRESSURE: 119 MMHG | TEMPERATURE: 97.9 F | BODY MASS INDEX: 23.05 KG/M2 | RESPIRATION RATE: 18 BRPM | HEIGHT: 64 IN | WEIGHT: 135 LBS | HEART RATE: 69 BPM | DIASTOLIC BLOOD PRESSURE: 66 MMHG | OXYGEN SATURATION: 97 %

## 2020-01-10 DIAGNOSIS — S67.22XD HAND CRUSH INJURY, LEFT, SUBSEQUENT ENCOUNTER: ICD-10-CM

## 2020-01-10 DIAGNOSIS — S67.22XD HAND CRUSH INJURY, LEFT, SUBSEQUENT ENCOUNTER: Primary | ICD-10-CM

## 2020-01-10 PROBLEM — R20.2 PARESTHESIA: Status: RESOLVED | Noted: 2019-10-22 | Resolved: 2020-01-10

## 2020-01-10 PROCEDURE — 73140 X-RAY EXAM OF FINGER(S): CPT | Mod: LT

## 2020-01-10 PROCEDURE — 99213 OFFICE O/P EST LOW 20 MIN: CPT | Performed by: FAMILY MEDICINE

## 2020-01-10 ASSESSMENT — PAIN SCALES - GENERAL: PAINLEVEL: NO PAIN (1)

## 2020-01-10 ASSESSMENT — MIFFLIN-ST. JEOR: SCORE: 1282.36

## 2020-01-10 NOTE — PROGRESS NOTES
"Subjective     Leona Cano is a 39 year old female who presents to clinic today for the following health issues:    HPI   Joint Pain    Onset: DOI 9/17/19, patient has been doing manual labor and wants to recheck    Description:   Location: Left Hand  Character: Sore pain with pulling    Intensity: moderate    Progression of Symptoms: better    Accompanying Signs & Symptoms:  Other symptoms: None    History:   Previous similar pain: YES      Precipitating factors:   Trauma or overuse: YES- WORK COMP    Alleviating factors:  Improved by:PT     Therapies Tried and outcome: None      Patient Active Problem List   Diagnosis   (none) - all problems resolved or deleted     No past surgical history on file.    Social History     Tobacco Use     Smoking status: Former Smoker     Smokeless tobacco: Never Used   Substance Use Topics     Alcohol use: Yes     Family History   Problem Relation Age of Onset     Heart Disease Mother          Current Outpatient Medications   Medication Sig Dispense Refill     ALPRAZolam (XANAX) 1 MG tablet Take 1 mg by mouth       No Known Allergies    Reviewed and updated as needed this visit by Provider         Review of Systems   ROS COMP: Constitutional, HEENT, cardiovascular, pulmonary, GI, , musculoskeletal, neuro, skin, endocrine and psych systems are negative, except as otherwise noted.      Objective    /66 (BP Location: Left arm, Patient Position: Chair, Cuff Size: Adult Regular)   Pulse 69   Temp 97.9  F (36.6  C) (Oral)   Resp 18   Ht 1.626 m (5' 4\")   Wt 61.2 kg (135 lb)   LMP 01/01/2020 (Approximate)   SpO2 97%   BMI 23.17 kg/m    Body mass index is 23.17 kg/m .  Physical Exam   GENERAL: healthy, alert and no distress  NECK: no adenopathy, no asymmetry, masses, or scars and thyroid normal to palpation  RESP: lungs clear to auscultation - no rales, rhonchi or wheezes  CV: regular rate and rhythm, normal S1 S2, no S3 or S4, no murmur, click or rub, no peripheral edema " and peripheral pulses strong  ABDOMEN: soft, nontender, no hepatosplenomegaly, no masses and bowel sounds normal  MS: mild tenderness along middle phalanx of left 3rd and 4th digit, keloid decreasing  Diagnostic Test Results:  Labs reviewed in Epic        Assessment & Plan     1. Hand crush injury, left, subsequent encounter  Improving. Patient actually is doing full duty work at this time. Okay with this. Discussed working on strengthening exercises. RTC as needed if symptoms worsen.  - XR Finger Left G/E 2 Views; Future       See Patient Instructions    No follow-ups on file.    Joey Valente MD, MD  [facility]

## 2020-01-10 NOTE — PATIENT INSTRUCTIONS
At [facility], we strive to deliver an exceptional experience to you, every time we see you.  If you receive a survey in the mail, please send us back your thoughts. We really do value your feedback.    Based on your medical history, these are the current health maintenance/preventive care services that you are due for (some may have been done at this visit.)  Health Maintenance Due   Topic Date Due     PREVENTIVE CARE VISIT  1982     HIV SCREENING  05/05/1997     INFLUENZA VACCINE (1) 09/01/2019     PHQ-2  01/01/2020         Suggested websites for health information:  Www.[employer] : Up to date and easily searchable information on multiple topics.  Www.medlineplus.gov : medication info, interactive tutorials, watch real surgeries online  Www.familydoctor.org : good info from the Academy of Family Physicians  Www.cdc.gov : public health info, travel advisories, epidemics (H1N1)  Www.aap.org : children's health info, normal development, vaccinations  Www.health.[facility].[place].us : [place] dept of health, public health issues in [place], N1N1    Your care team:                            Family Medicine Internal Medicine   MD Desmond Bowie MD Shantel Branch-Fleming, MD Katya Georgiev PA-C Nam Ho, MD Pediatrics   NEETA Kuo, MD Emma Braden CNP, MD Deborah Mielke, MD Kim Thein, APRN CNP      Clinic hours: Monday - Thursday 7 am-7 pm; Fridays 7 am-5 pm.   Urgent care: Monday - Friday 11 am-9 pm; Saturday and Sunday 9 am-5 pm.  Pharmacy : Monday -Thursday 8 am-8 pm; Friday 8 am-6 pm; Saturday and Sunday 9 am-5 pm.     Clinic: [phone]   Pharmacy: [phone]